# Patient Record
Sex: MALE
[De-identification: names, ages, dates, MRNs, and addresses within clinical notes are randomized per-mention and may not be internally consistent; named-entity substitution may affect disease eponyms.]

---

## 2021-05-10 ENCOUNTER — APPOINTMENT (OUTPATIENT)
Dept: DISASTER EMERGENCY | Facility: OTHER | Age: 41
End: 2021-05-10
Payer: COMMERCIAL

## 2021-05-10 PROCEDURE — 0012A: CPT

## 2021-12-22 ENCOUNTER — TRANSCRIPTION ENCOUNTER (OUTPATIENT)
Age: 41
End: 2021-12-22

## 2025-06-16 ENCOUNTER — INPATIENT (INPATIENT)
Facility: HOSPITAL | Age: 45
LOS: 1 days | Discharge: HOME | End: 2025-06-18
Attending: STUDENT IN AN ORGANIZED HEALTH CARE EDUCATION/TRAINING PROGRAM | Admitting: STUDENT IN AN ORGANIZED HEALTH CARE EDUCATION/TRAINING PROGRAM
Payer: COMMERCIAL

## 2025-06-16 VITALS
HEART RATE: 101 BPM | OXYGEN SATURATION: 98 % | DIASTOLIC BLOOD PRESSURE: 119 MMHG | SYSTOLIC BLOOD PRESSURE: 148 MMHG | WEIGHT: 212.08 LBS | RESPIRATION RATE: 20 BRPM | HEIGHT: 70 IN | TEMPERATURE: 98 F

## 2025-06-16 DIAGNOSIS — I10 ESSENTIAL (PRIMARY) HYPERTENSION: ICD-10-CM

## 2025-06-16 DIAGNOSIS — E03.9 HYPOTHYROIDISM, UNSPECIFIED: ICD-10-CM

## 2025-06-16 DIAGNOSIS — E78.5 HYPERLIPIDEMIA, UNSPECIFIED: ICD-10-CM

## 2025-06-16 DIAGNOSIS — I48.91 UNSPECIFIED ATRIAL FIBRILLATION: ICD-10-CM

## 2025-06-16 LAB
ALBUMIN SERPL ELPH-MCNC: 3.9 G/DL — SIGNIFICANT CHANGE UP (ref 3.3–5)
ALP SERPL-CCNC: 59 U/L — SIGNIFICANT CHANGE UP (ref 40–120)
ALT FLD-CCNC: 33 U/L — SIGNIFICANT CHANGE UP (ref 12–78)
ANION GAP SERPL CALC-SCNC: 5 MMOL/L — SIGNIFICANT CHANGE UP (ref 5–17)
AST SERPL-CCNC: 21 U/L — SIGNIFICANT CHANGE UP (ref 15–37)
BASOPHILS # BLD AUTO: 0.04 K/UL — SIGNIFICANT CHANGE UP (ref 0–0.2)
BASOPHILS NFR BLD AUTO: 0.5 % — SIGNIFICANT CHANGE UP (ref 0–2)
BILIRUB SERPL-MCNC: 0.4 MG/DL — SIGNIFICANT CHANGE UP (ref 0.2–1.2)
BUN SERPL-MCNC: 16 MG/DL — SIGNIFICANT CHANGE UP (ref 7–23)
CALCIUM SERPL-MCNC: 8.6 MG/DL — SIGNIFICANT CHANGE UP (ref 8.5–10.1)
CHLORIDE SERPL-SCNC: 111 MMOL/L — HIGH (ref 96–108)
CO2 SERPL-SCNC: 23 MMOL/L — SIGNIFICANT CHANGE UP (ref 22–31)
CREAT SERPL-MCNC: 0.96 MG/DL — SIGNIFICANT CHANGE UP (ref 0.5–1.3)
EGFR: 99 ML/MIN/1.73M2 — SIGNIFICANT CHANGE UP
EGFR: 99 ML/MIN/1.73M2 — SIGNIFICANT CHANGE UP
EOSINOPHIL # BLD AUTO: 0.11 K/UL — SIGNIFICANT CHANGE UP (ref 0–0.5)
EOSINOPHIL NFR BLD AUTO: 1.4 % — SIGNIFICANT CHANGE UP (ref 0–6)
GLUCOSE SERPL-MCNC: 117 MG/DL — HIGH (ref 70–99)
HCT VFR BLD CALC: 40.1 % — SIGNIFICANT CHANGE UP (ref 39–50)
HGB BLD-MCNC: 13.4 G/DL — SIGNIFICANT CHANGE UP (ref 13–17)
IMM GRANULOCYTES NFR BLD AUTO: 0.3 % — SIGNIFICANT CHANGE UP (ref 0–0.9)
LYMPHOCYTES # BLD AUTO: 2.67 K/UL — SIGNIFICANT CHANGE UP (ref 1–3.3)
LYMPHOCYTES # BLD AUTO: 34 % — SIGNIFICANT CHANGE UP (ref 13–44)
MAGNESIUM SERPL-MCNC: 2 MG/DL — SIGNIFICANT CHANGE UP (ref 1.6–2.6)
MCHC RBC-ENTMCNC: 29.2 PG — SIGNIFICANT CHANGE UP (ref 27–34)
MCHC RBC-ENTMCNC: 33.4 G/DL — SIGNIFICANT CHANGE UP (ref 32–36)
MCV RBC AUTO: 87.4 FL — SIGNIFICANT CHANGE UP (ref 80–100)
MONOCYTES # BLD AUTO: 0.59 K/UL — SIGNIFICANT CHANGE UP (ref 0–0.9)
MONOCYTES NFR BLD AUTO: 7.5 % — SIGNIFICANT CHANGE UP (ref 2–14)
NEUTROPHILS # BLD AUTO: 4.42 K/UL — SIGNIFICANT CHANGE UP (ref 1.8–7.4)
NEUTROPHILS NFR BLD AUTO: 56.3 % — SIGNIFICANT CHANGE UP (ref 43–77)
NRBC BLD AUTO-RTO: 0 /100 WBCS — SIGNIFICANT CHANGE UP (ref 0–0)
NT-PROBNP SERPL-SCNC: 69 PG/ML — SIGNIFICANT CHANGE UP (ref 0–125)
PLATELET # BLD AUTO: 240 K/UL — SIGNIFICANT CHANGE UP (ref 150–400)
POTASSIUM SERPL-MCNC: 3.7 MMOL/L — SIGNIFICANT CHANGE UP (ref 3.5–5.3)
POTASSIUM SERPL-SCNC: 3.7 MMOL/L — SIGNIFICANT CHANGE UP (ref 3.5–5.3)
PROT SERPL-MCNC: 7.3 GM/DL — SIGNIFICANT CHANGE UP (ref 6–8.3)
RBC # BLD: 4.59 M/UL — SIGNIFICANT CHANGE UP (ref 4.2–5.8)
RBC # FLD: 14 % — SIGNIFICANT CHANGE UP (ref 10.3–14.5)
SODIUM SERPL-SCNC: 139 MMOL/L — SIGNIFICANT CHANGE UP (ref 135–145)
TROPONIN I, HIGH SENSITIVITY RESULT: 15 NG/L — SIGNIFICANT CHANGE UP
WBC # BLD: 7.85 K/UL — SIGNIFICANT CHANGE UP (ref 3.8–10.5)
WBC # FLD AUTO: 7.85 K/UL — SIGNIFICANT CHANGE UP (ref 3.8–10.5)

## 2025-06-16 PROCEDURE — 71045 X-RAY EXAM CHEST 1 VIEW: CPT | Mod: 26

## 2025-06-16 PROCEDURE — 99222 1ST HOSP IP/OBS MODERATE 55: CPT

## 2025-06-16 PROCEDURE — 93010 ELECTROCARDIOGRAM REPORT: CPT

## 2025-06-16 PROCEDURE — 99285 EMERGENCY DEPT VISIT HI MDM: CPT

## 2025-06-16 RX ORDER — LOSARTAN POTASSIUM 100 MG/1
50 TABLET, FILM COATED ORAL DAILY
Refills: 0 | Status: DISCONTINUED | OUTPATIENT
Start: 2025-06-16 | End: 2025-06-18

## 2025-06-16 RX ORDER — ASPIRIN 325 MG
81 TABLET ORAL DAILY
Refills: 0 | Status: DISCONTINUED | OUTPATIENT
Start: 2025-06-17 | End: 2025-06-18

## 2025-06-16 RX ORDER — ACETAMINOPHEN 500 MG/5ML
650 LIQUID (ML) ORAL EVERY 6 HOURS
Refills: 0 | Status: DISCONTINUED | OUTPATIENT
Start: 2025-06-16 | End: 2025-06-18

## 2025-06-16 RX ORDER — ROSUVASTATIN CALCIUM 20 MG/1
20 TABLET, FILM COATED ORAL AT BEDTIME
Refills: 0 | Status: DISCONTINUED | OUTPATIENT
Start: 2025-06-16 | End: 2025-06-18

## 2025-06-16 RX ORDER — ROSUVASTATIN CALCIUM 20 MG/1
1 TABLET, FILM COATED ORAL
Refills: 0 | DISCHARGE

## 2025-06-16 RX ORDER — METOPROLOL SUCCINATE 50 MG/1
25 TABLET, EXTENDED RELEASE ORAL
Refills: 0 | Status: DISCONTINUED | OUTPATIENT
Start: 2025-06-16 | End: 2025-06-17

## 2025-06-16 RX ORDER — ASPIRIN 325 MG
325 TABLET ORAL ONCE
Refills: 0 | Status: COMPLETED | OUTPATIENT
Start: 2025-06-16 | End: 2025-06-16

## 2025-06-16 RX ORDER — LEVOTHYROXINE SODIUM 300 MCG
175 TABLET ORAL DAILY
Refills: 0 | Status: DISCONTINUED | OUTPATIENT
Start: 2025-06-16 | End: 2025-06-18

## 2025-06-16 RX ORDER — LEVOTHYROXINE SODIUM 300 MCG
1 TABLET ORAL
Refills: 0 | DISCHARGE

## 2025-06-16 RX ORDER — MELATONIN 5 MG
3 TABLET ORAL AT BEDTIME
Refills: 0 | Status: DISCONTINUED | OUTPATIENT
Start: 2025-06-16 | End: 2025-06-18

## 2025-06-16 RX ORDER — AMLODIPINE BESYLATE 10 MG/1
1 TABLET ORAL
Refills: 0 | DISCHARGE

## 2025-06-16 RX ADMIN — METOPROLOL SUCCINATE 25 MILLIGRAM(S): 50 TABLET, EXTENDED RELEASE ORAL at 17:43

## 2025-06-16 RX ADMIN — ROSUVASTATIN CALCIUM 20 MILLIGRAM(S): 20 TABLET, FILM COATED ORAL at 22:33

## 2025-06-16 RX ADMIN — Medication 325 MILLIGRAM(S): at 10:37

## 2025-06-16 RX ADMIN — METOPROLOL SUCCINATE 25 MILLIGRAM(S): 50 TABLET, EXTENDED RELEASE ORAL at 12:00

## 2025-06-16 NOTE — ED PROVIDER NOTE - PROGRESS NOTE DETAILS
pt signed out to me from Dr warner, pt presented with palpitations, found to be in a fib, has h/o flutter in the past, pending labs and admission

## 2025-06-16 NOTE — ED PROVIDER NOTE - CLINICAL SUMMARY MEDICAL DECISION MAKING FREE TEXT BOX
44 M pmh HTN, HLD, hypothyroid presenting to the ED for palpitations    EKG revealing Afib  will hold heparin (low risk for CVA)  labs  CXR  tele admit

## 2025-06-16 NOTE — ED ADULT NURSE NOTE - CHIEF COMPLAINT QUOTE
Pt c/o palpitations for a month, chest tightness. As per pt his I watch says "afib" Pmhx HTN, HLD, Thyroid disease. NKDA

## 2025-06-16 NOTE — H&P ADULT - PROBLEM SELECTOR PLAN 4
h/p hyperthyroidism s/p radioactive iodine treatment, now hypothyroidism on synthroid 175mcg daily  TSH normal

## 2025-06-16 NOTE — H&P ADULT - HISTORY OF PRESENT ILLNESS
44 years old male with h/o HTN, HLD, hypothyroidism, h/o atrial flutter present to ED with complain of tightness of chest pain palpitation. Patient reported intermitted palpitation for a months and recently worsened. Today AM, patient woke up with episode of palpitation associated with tightness of chest. No dizziness or diaphoresis.  Hemodynamically stable, afebrile, sat well at RA. EKG noted Afib with RVR. No leukocytosis, ptl 240, TSH normal, hsTnT negative. CXR with no focal consolidation

## 2025-06-16 NOTE — ED PROVIDER NOTE - OBJECTIVE STATEMENT
44 M pmh HTNM, hypothyroid, HLD presenting to the ED for palpitations. no current chest pain. patient states that he has chest pain yesterday and today he was awoken when his watch told him about an abnormal rhythm.

## 2025-06-16 NOTE — ED PROVIDER NOTE - CONSIDERATION OF ADMISSION OBSERVATION
will admit for new-onset Afib, will require cards evaluation and work-up. CHADSVASC score 1, will hold anticoagulation Consideration of Admission/Observation

## 2025-06-16 NOTE — ED PROVIDER NOTE - WR ORDER ID 1
Pharmacist Admission Medication History    Admission medication history is complete. The information provided in this note is only as accurate as the sources available at the time of the update.    Information Source(s): Patient and CareEverywhere/SureScripts via in-person    Pertinent Information: none    Changes made to PTA medication list:  Added: None  Deleted: celebrex, docusate, gabapentin, Norco, Prilosec, Zofran  Changed: None    Medication Affordability:  Not including over the counter (OTC) medications, was there a time in the past 3 months when you did not take your medications as prescribed because of cost?: No    Allergies reviewed with patient and updates made in EHR: yes    Medication History Completed By: Sunita Becerra RPH 11/16/2023 2:30 PM    PTA Med List   Medication Sig Last Dose    diltiazem ER (DILT-XR) 180 MG 24 hr capsule Take 1 capsule (180 mg) by mouth daily 11/16/2023 at AM    levothyroxine (SYNTHROID/LEVOTHROID) 150 MCG tablet Take 1 tablet (150 mcg) by mouth daily With 12.5mg tab 11/16/2023 at AM    levothyroxine (SYNTHROID/LEVOTHROID) 25 MCG tablet TAKE 1/2 TABLET BY MOUTH ONCE DAILY WITH 150MCG TABLET 11/16/2023 at AM    multivitamin w/minerals (THERA-VIT-M) tablet Take 1 tablet by mouth daily 11/16/2023 at AM    pravastatin (PRAVACHOL) 40 MG tablet TAKE 1 TABLET BY MOUTH ONCE DAILY (Patient taking differently: Take 40 mg by mouth every morning) 11/16/2023 at AM        [id number]

## 2025-06-16 NOTE — H&P ADULT - PROBLEM SELECTOR PLAN 1
EKG  ( I personally review and interpreted the images) Afib with RVR  hsTnT negative, BNP normal, TSH normal  h/o aflutter in 2015, following with cardiology. Outpatient record reviewed from patient's phone, Negative exercise stress test  and normal ECHO at [facility] on 11/2022  CHADVASC 1  aspirin 81mg  Start metoprolol 25mg bid, monitor HR and titrate BB.  Telemetry monitoring. If HR is controlled, outpatient follow up with his cardiologist

## 2025-06-16 NOTE — ED ADULT NURSE REASSESSMENT NOTE - NS ED NURSE REASSESS COMMENT FT1
"Pharmacy calls to request updated prescription for blood glucose monitoring (NO BRAND SPECIFIED) meter device kit. They are requesting that the phrase \"as directed\" be removed from the prescription as Medicare does not cover prescriptions using that verbiage.  " pt updated on care, given breakfast tray at this time.

## 2025-06-16 NOTE — H&P ADULT - NSHPPHYSICALEXAM_GEN_ALL_CORE
CONSTITUTIONAL: alert and cooperative, no acute distress  EYES: PERRL, no scleral icterus  ENT: Mucosa moist, tongue normal  NECK: Neck supple, trachea midline, non-tender  CARDIAC: Afib. No Pedal edema  LUNGS: Equal air entry both lungs. No rales, rhonchi, wheezing. Normal respiratory effort.   ABDOMEN: Soft, nondistended, nontender. No guarding or rebound tenderness. No hepatomegaly or splenomegaly. Bowel sound normal.  MUSCULOSKELETAL: Normocephalic, atraumatic. No significant deformity or joint abnormality  NEUROLOGICAL: No gross motor or sensory deficits  SKIN: no lesions or eruptions. Normal turgor  PSYCHIATRIC: A&O x 3, appropriate mood and affect

## 2025-06-16 NOTE — ED ADULT TRIAGE NOTE - CHIEF COMPLAINT QUOTE
Pt c/o palpitations for a month, chest tightness. Pmhx HTN, HLD, Thyroid disease. NKDA Pt c/o palpitations for a month, chest tightness. As per pt his I watch says "afib" Pmhx HTN, HLD, Thyroid disease. NKDA

## 2025-06-16 NOTE — ED ADULT NURSE REASSESSMENT NOTE - NS ED NURSE REASSESS COMMENT FT1
received pt from previous RN, pt AAOx4 breathing w/ ease on RA in no acute distress. On CCM, awaiting further orders, nursing care continues.

## 2025-06-16 NOTE — ED PROVIDER NOTE - NS ED ROS FT
General: Denies fever, chills  HEENT: Denies sensory changes, sore throat  Neck: Denies neck pain, neck stiffness  Resp: Denies coughing, SOB  Cardiovascular: + palpitations  GI: Denies nausea, vomiting, abdominal pain, diarrhea, constipation, blood in stool  : Denies dysuria, hematuria, frequency, incontinence  MSK: Denies back pain  Neuro: Denies HA, dizziness, numbness, weakness  Skin: Denies rashes.

## 2025-06-16 NOTE — ED ADULT NURSE NOTE - ED STAT RN HANDOFF DETAILS
report given to Damian RN, pt A/Ox4, pt in bed, even and unlabored respirations noted, pt on continuous cardiac monitor, pt a-fib on monitor, no signs or symptoms of acute distress noted

## 2025-06-16 NOTE — H&P ADULT - PROBLEM SELECTOR PLAN 2
continue losartan 50mg daily  Recently started on amlodipine 2.5mg. Now with Afib with RVR. Hold amlodipine and start metoprolol 25mg bid  Monitor BP and titrate BP med

## 2025-06-16 NOTE — ED PROVIDER NOTE - PHYSICAL EXAMINATION
General: Well appearing male in no acute distress  HEENT: Normocephalic, atraumatic. Moist mucous membranes. Oropharynx clear. No lymphadenopathy.  Eyes: No scleral icterus. EOMI. DANII.  Neck:. Soft and supple. Full ROM without pain. No midline tenderness  Cardiac: irregular rate & rhythm. No murmurs, rubs, gallops. Peripheral pulses 2+ and symmetric. No LE edema.  Resp: Lungs CTAB. Speaking in full sentences. No wheezes, rales or rhonchi.  Abd: Soft, non-tender, non-distended. No guarding or rebound. No scars, masses, or lesions.  Back: Spine midline and non-tender. No CVA tenderness.    Skin: No rashes, abrasions, or lacerations.  Neuro: AO x 3. Moves all extremities symmetrically. Motor strength and sensation grossly intact. ambulatory w/ steady gait

## 2025-06-17 ENCOUNTER — RESULT REVIEW (OUTPATIENT)
Age: 45
End: 2025-06-17

## 2025-06-17 LAB
A1C WITH ESTIMATED AVERAGE GLUCOSE RESULT: 5.6 % — SIGNIFICANT CHANGE UP (ref 4–5.6)
ALBUMIN SERPL ELPH-MCNC: 3.9 G/DL — SIGNIFICANT CHANGE UP (ref 3.3–5)
ALP SERPL-CCNC: 66 U/L — SIGNIFICANT CHANGE UP (ref 40–120)
ALT FLD-CCNC: 33 U/L — SIGNIFICANT CHANGE UP (ref 12–78)
ANION GAP SERPL CALC-SCNC: 8 MMOL/L — SIGNIFICANT CHANGE UP (ref 5–17)
AST SERPL-CCNC: 18 U/L — SIGNIFICANT CHANGE UP (ref 15–37)
BILIRUB SERPL-MCNC: 0.6 MG/DL — SIGNIFICANT CHANGE UP (ref 0.2–1.2)
BUN SERPL-MCNC: 16 MG/DL — SIGNIFICANT CHANGE UP (ref 7–23)
CALCIUM SERPL-MCNC: 9 MG/DL — SIGNIFICANT CHANGE UP (ref 8.5–10.1)
CHLORIDE SERPL-SCNC: 106 MMOL/L — SIGNIFICANT CHANGE UP (ref 96–108)
CHOLEST SERPL-MCNC: 108 MG/DL — SIGNIFICANT CHANGE UP
CO2 SERPL-SCNC: 23 MMOL/L — SIGNIFICANT CHANGE UP (ref 22–31)
CREAT SERPL-MCNC: 1.12 MG/DL — SIGNIFICANT CHANGE UP (ref 0.5–1.3)
EGFR: 82 ML/MIN/1.73M2 — SIGNIFICANT CHANGE UP
EGFR: 82 ML/MIN/1.73M2 — SIGNIFICANT CHANGE UP
ESTIMATED AVERAGE GLUCOSE: 114 MG/DL — SIGNIFICANT CHANGE UP (ref 68–114)
GLUCOSE SERPL-MCNC: 96 MG/DL — SIGNIFICANT CHANGE UP (ref 70–99)
HCT VFR BLD CALC: 39.9 % — SIGNIFICANT CHANGE UP (ref 39–50)
HDLC SERPL-MCNC: 28 MG/DL — LOW
HGB BLD-MCNC: 13.1 G/DL — SIGNIFICANT CHANGE UP (ref 13–17)
LDLC SERPL-MCNC: 53 MG/DL — SIGNIFICANT CHANGE UP
LIPID PNL WITH DIRECT LDL SERPL: 53 MG/DL — SIGNIFICANT CHANGE UP
MAGNESIUM SERPL-MCNC: 2.1 MG/DL — SIGNIFICANT CHANGE UP (ref 1.6–2.6)
MCHC RBC-ENTMCNC: 28.6 PG — SIGNIFICANT CHANGE UP (ref 27–34)
MCHC RBC-ENTMCNC: 32.8 G/DL — SIGNIFICANT CHANGE UP (ref 32–36)
MCV RBC AUTO: 87.1 FL — SIGNIFICANT CHANGE UP (ref 80–100)
NONHDLC SERPL-MCNC: 80 MG/DL — SIGNIFICANT CHANGE UP
NRBC BLD AUTO-RTO: 0 /100 WBCS — SIGNIFICANT CHANGE UP (ref 0–0)
PHOSPHATE SERPL-MCNC: 3.5 MG/DL — SIGNIFICANT CHANGE UP (ref 2.5–4.5)
PLATELET # BLD AUTO: 244 K/UL — SIGNIFICANT CHANGE UP (ref 150–400)
POTASSIUM SERPL-MCNC: 3.8 MMOL/L — SIGNIFICANT CHANGE UP (ref 3.5–5.3)
POTASSIUM SERPL-SCNC: 3.8 MMOL/L — SIGNIFICANT CHANGE UP (ref 3.5–5.3)
PROT SERPL-MCNC: 7.3 GM/DL — SIGNIFICANT CHANGE UP (ref 6–8.3)
RBC # BLD: 4.58 M/UL — SIGNIFICANT CHANGE UP (ref 4.2–5.8)
RBC # FLD: 13.9 % — SIGNIFICANT CHANGE UP (ref 10.3–14.5)
SODIUM SERPL-SCNC: 137 MMOL/L — SIGNIFICANT CHANGE UP (ref 135–145)
TRIGL SERPL-MCNC: 155 MG/DL — HIGH
WBC # BLD: 7.41 K/UL — SIGNIFICANT CHANGE UP (ref 3.8–10.5)
WBC # FLD AUTO: 7.41 K/UL — SIGNIFICANT CHANGE UP (ref 3.8–10.5)

## 2025-06-17 PROCEDURE — 99255 IP/OBS CONSLTJ NEW/EST HI 80: CPT

## 2025-06-17 PROCEDURE — 99232 SBSQ HOSP IP/OBS MODERATE 35: CPT

## 2025-06-17 PROCEDURE — 93306 TTE W/DOPPLER COMPLETE: CPT | Mod: 26

## 2025-06-17 PROCEDURE — 93010 ELECTROCARDIOGRAM REPORT: CPT

## 2025-06-17 RX ORDER — METOPROLOL SUCCINATE 50 MG/1
50 TABLET, EXTENDED RELEASE ORAL
Refills: 0 | Status: DISCONTINUED | OUTPATIENT
Start: 2025-06-17 | End: 2025-06-18

## 2025-06-17 RX ORDER — AMLODIPINE BESYLATE 10 MG/1
2.5 TABLET ORAL DAILY
Refills: 0 | Status: DISCONTINUED | OUTPATIENT
Start: 2025-06-17 | End: 2025-06-18

## 2025-06-17 RX ADMIN — METOPROLOL SUCCINATE 25 MILLIGRAM(S): 50 TABLET, EXTENDED RELEASE ORAL at 05:56

## 2025-06-17 RX ADMIN — ROSUVASTATIN CALCIUM 20 MILLIGRAM(S): 20 TABLET, FILM COATED ORAL at 21:01

## 2025-06-17 RX ADMIN — Medication 175 MICROGRAM(S): at 05:55

## 2025-06-17 RX ADMIN — Medication 81 MILLIGRAM(S): at 11:13

## 2025-06-17 NOTE — PROGRESS NOTE ADULT - SUBJECTIVE AND OBJECTIVE BOX
Patient is a 44y old  Male who presents with a chief complaint of Afib with RVR (16 Jun 2025 14:47)      INTERVAL HPI/OVERNIGHT EVENTS:  Patient seen and examined at bedside this am. He reports feeling well. No new complaints.     MEDICATIONS  (STANDING):  aspirin enteric coated 81 milliGRAM(s) Oral daily  levothyroxine 175 MICROGram(s) Oral daily  losartan 50 milliGRAM(s) Oral daily  rosuvastatin 20 milliGRAM(s) Oral at bedtime    MEDICATIONS  (PRN):  acetaminophen     Tablet .. 650 milliGRAM(s) Oral every 6 hours PRN Mild Pain (1 - 3), Moderate Pain (4 - 6)  melatonin 3 milliGRAM(s) Oral at bedtime PRN Insomnia      Allergies    No Known Allergies    Intolerances        REVIEW OF SYSTEMS:  CONSTITUTIONAL: No fever, weight loss  RESPIRATORY: No cough, wheezing, chills or hemoptysis; No shortness of breath  CARDIOVASCULAR: No chest pain, palpitations, lightheadedness, or leg swelling  GASTROINTESTINAL: No abdominal or epigastric pain. No nausea, vomiting, or hematemesis; No diarrhea or constipation. No melena or hematochezia.  MUSCULOSKELETAL: No joint pain or swelling; No muscle, back, or extremity pain  PSYCHIATRIC: No depression, anxiety, or mood swings      Vital Signs Last 24 Hrs  T(C): 36.9 (17 Jun 2025 16:01), Max: 36.9 (17 Jun 2025 16:01)  T(F): 98.5 (17 Jun 2025 16:01), Max: 98.5 (17 Jun 2025 16:01)  HR: 72 (17 Jun 2025 16:01) (58 - 74)  BP: 142/92 (17 Jun 2025 16:01) (116/77 - 142/92)  BP(mean): --  RR: 15 (17 Jun 2025 16:01) (15 - 19)  SpO2: 99% (17 Jun 2025 16:01) (96% - 100%)    Parameters below as of 17 Jun 2025 16:01  Patient On (Oxygen Delivery Method): room air        PHYSICAL EXAM:  GENERAL: NAD, well-groomed, well-developed  HEAD:  Atraumatic, Normocephalic  EYES: EOMI, PERRLA, conjunctiva and sclera clear  ENMT: Moist mucous membranes, Good dentition, No lesions; No tonsillar erythema, exudates, or enlargement  NECK: Supple, No JVD, Normal thyroid  NERVOUS SYSTEM:  Alert & Oriented X3, Good concentration; All 4 extremities mobile, no gross sensory deficits.   CHEST/LUNG: Clear to auscultation bilaterally; No rales, rhonchi, wheezing, or rubs  HEART: Regular rate and rhythm; No murmurs, rubs, or gallops  ABDOMEN: Soft, Nontender, Nondistended; Bowel sounds present  EXTREMITIES:  2+ Peripheral Pulses, No clubbing, cyanosis, or edema  SKIN: No rashes or lesions    LABS:                        13.1   7.41  )-----------( 244      ( 17 Jun 2025 06:05 )             39.9     17 Jun 2025 06:05    137    |  106    |  16     ----------------------------<  96     3.8     |  23     |  1.12     Ca    9.0        17 Jun 2025 06:05  Phos  3.5       17 Jun 2025 06:05  Mg     2.1       17 Jun 2025 06:05    TPro  7.3    /  Alb  3.9    /  TBili  0.6    /  DBili  x      /  AST  18     /  ALT  33     /  AlkPhos  66     17 Jun 2025 06:05      Urinalysis Basic - ( 17 Jun 2025 06:05 )    Color: x / Appearance: x / SG: x / pH: x  Gluc: 96 mg/dL / Ketone: x  / Bili: x / Urobili: x   Blood: x / Protein: x / Nitrite: x   Leuk Esterase: x / RBC: x / WBC x   Sq Epi: x / Non Sq Epi: x / Bacteria: x

## 2025-06-17 NOTE — PROGRESS NOTE ADULT - ASSESSMENT
44 years old male with h/o HTN, HLD, hypothyroidism, h/o atrial flutter present to ED with complain of tightness of chest pain palpitation. Patient reported intermitted palpitation for a months and recently worsened. Today AM, patient woke up with episode of palpitation associated with tightness of chest. No dizziness or diaphoresis.  Hemodynamically stable, afebrile, sat well at RA. EKG noted Afib with RVR. No leukocytosis, ptl 240, TSH normal, hsTnT negative. CXR with no focal consolidation      1.  Atrial fibrillation with rapid ventricular response.    EKG on admission Afib with RVR  hsTnT negative, BNP normal, TSH normal  h/o aflutter in 2015, following with cardiology. Outpatient record reviewed from patient's phone, Negative exercise stress test  and normal ECHO at [facility] on 11/2022  CHADVASC 1  aspirin 81mg  Started metoprolol 25mg bid, monitor HR and titrate BB. On hold currently as HR 60s. Patient initially converted and remained in sinus rhythm however on telemonitor is showing paroxysmal rate controlled Afib. Repeat EKG with sinus, cardiology consulted.   Telemetry monitoring. If HR is controlled and regular, outpatient follow up with his cardiologist.    2·  Benign essential HTN.   continue losartan 50mg daily  Recently started on amlodipine 2.5mg. c/w amlodipine and holding metoprolol 25mg bid due to heart rate  Monitor BP and titrate BP med.    3.  Hyperlipidemia, unspecified.    rosuvastatin 20mg hs.     4.Hypothyroidism.    h/p hyperthyroidism s/p radioactive iodine treatment, now hypothyroidism on synthroid 175mcg daily  TSH normal.    DVT: patient frequently ambulating and low risk for DVT

## 2025-06-17 NOTE — CONSULT NOTE ADULT - ASSESSMENT
44b M MOB HTN HLD AF RVR TSH normal  TTE pending  no recent flu like illness no ALVARES, C/P   palpitations no syncope  Agree losartan 25/d, rosuvastatin 20/d, ASA 81/d suggest add metoprolol 50mg BID  does not meet criteria for OAC stroke prevention  Consider GLP1 for weight loss and underlying AF resolution  no evidence of ACS (Trop or EKG)

## 2025-06-18 ENCOUNTER — TRANSCRIPTION ENCOUNTER (OUTPATIENT)
Age: 45
End: 2025-06-18

## 2025-06-18 VITALS
DIASTOLIC BLOOD PRESSURE: 86 MMHG | OXYGEN SATURATION: 97 % | RESPIRATION RATE: 18 BRPM | SYSTOLIC BLOOD PRESSURE: 125 MMHG | HEART RATE: 66 BPM | TEMPERATURE: 98 F

## 2025-06-18 LAB
ANION GAP SERPL CALC-SCNC: 7 MMOL/L — SIGNIFICANT CHANGE UP (ref 5–17)
BUN SERPL-MCNC: 16 MG/DL — SIGNIFICANT CHANGE UP (ref 7–23)
CALCIUM SERPL-MCNC: 9.1 MG/DL — SIGNIFICANT CHANGE UP (ref 8.5–10.1)
CHLORIDE SERPL-SCNC: 107 MMOL/L — SIGNIFICANT CHANGE UP (ref 96–108)
CO2 SERPL-SCNC: 24 MMOL/L — SIGNIFICANT CHANGE UP (ref 22–31)
CREAT SERPL-MCNC: 1.06 MG/DL — SIGNIFICANT CHANGE UP (ref 0.5–1.3)
EGFR: 88 ML/MIN/1.73M2 — SIGNIFICANT CHANGE UP
EGFR: 88 ML/MIN/1.73M2 — SIGNIFICANT CHANGE UP
GLUCOSE SERPL-MCNC: 102 MG/DL — HIGH (ref 70–99)
MAGNESIUM SERPL-MCNC: 2.3 MG/DL — SIGNIFICANT CHANGE UP (ref 1.6–2.6)
PHOSPHATE SERPL-MCNC: 3.6 MG/DL — SIGNIFICANT CHANGE UP (ref 2.5–4.5)
POTASSIUM SERPL-MCNC: 3.8 MMOL/L — SIGNIFICANT CHANGE UP (ref 3.5–5.3)
POTASSIUM SERPL-SCNC: 3.8 MMOL/L — SIGNIFICANT CHANGE UP (ref 3.5–5.3)
SODIUM SERPL-SCNC: 138 MMOL/L — SIGNIFICANT CHANGE UP (ref 135–145)

## 2025-06-18 PROCEDURE — 99239 HOSP IP/OBS DSCHRG MGMT >30: CPT

## 2025-06-18 RX ORDER — METOPROLOL SUCCINATE 50 MG/1
50 TABLET, EXTENDED RELEASE ORAL DAILY
Refills: 0 | Status: DISCONTINUED | OUTPATIENT
Start: 2025-06-18 | End: 2025-06-18

## 2025-06-18 RX ORDER — LOSARTAN POTASSIUM 100 MG/1
25 TABLET, FILM COATED ORAL DAILY
Refills: 0 | Status: DISCONTINUED | OUTPATIENT
Start: 2025-06-19 | End: 2025-06-18

## 2025-06-18 RX ORDER — LOSARTAN POTASSIUM 100 MG/1
1 TABLET, FILM COATED ORAL
Refills: 0 | DISCHARGE

## 2025-06-18 RX ORDER — AMLODIPINE BESYLATE 10 MG/1
1 TABLET ORAL
Refills: 0 | DISCHARGE

## 2025-06-18 RX ORDER — LOSARTAN POTASSIUM 100 MG/1
1 TABLET, FILM COATED ORAL
Qty: 20 | Refills: 0
Start: 2025-06-18 | End: 2025-07-07

## 2025-06-18 RX ORDER — METOPROLOL SUCCINATE 50 MG/1
1 TABLET, EXTENDED RELEASE ORAL
Qty: 20 | Refills: 0
Start: 2025-06-18 | End: 2025-07-07

## 2025-06-18 RX ADMIN — METOPROLOL SUCCINATE 50 MILLIGRAM(S): 50 TABLET, EXTENDED RELEASE ORAL at 13:01

## 2025-06-18 RX ADMIN — METOPROLOL SUCCINATE 50 MILLIGRAM(S): 50 TABLET, EXTENDED RELEASE ORAL at 08:25

## 2025-06-18 RX ADMIN — Medication 81 MILLIGRAM(S): at 11:26

## 2025-06-18 RX ADMIN — Medication 175 MICROGRAM(S): at 05:11

## 2025-06-18 RX ADMIN — AMLODIPINE BESYLATE 2.5 MILLIGRAM(S): 10 TABLET ORAL at 05:11

## 2025-06-18 NOTE — DISCHARGE NOTE NURSING/CASE MANAGEMENT/SOCIAL WORK - PATIENT PORTAL LINK FT
You can access the FollowMyHealth Patient Portal offered by [facility] by registering at the following website: [email or web address]. By joining [facility]’s FollowMyHealth portal, you will also be able to view your health information using other applications (apps) compatible with our system.

## 2025-06-18 NOTE — PROGRESS NOTE ADULT - ASSESSMENT
44M HTN HLD hypothyroidism AF, p/w c/o RVR TSH normal  TTE pending  no recent flu like illness no ALVARES, C/P   palpitations no syncope  Agree losartan 25/d, rosuvastatin 20/d, ASA 81/d suggest add metoprolol 50mg BID  does not meet criteria for OAC stroke prevention  Consider GLP1 for weight loss and underlying AF resolution  no evidence of ACS (Trop or EKG) 44M HTN HLD hypothyroidism AF, p/w c/o palpitation and chest tightness   afib with RVR responded to bbl    currently in sinus  TSH wnl  TTE unremarkable    Add Toprol 50/d  cont losartan 25/d, rosuvastatin 20/d, ASA 81/d   will stop amlodipine   cont synthroid   does not meet criteria for OAC stroke prevention  no evidence of ACS (Trop or EKG)  outpatient cardiology follow up with his primary cardiologist post discharge in a week

## 2025-06-18 NOTE — PROGRESS NOTE ADULT - SUBJECTIVE AND OBJECTIVE BOX
Patient is a 44y old  Male who presents with a chief complaint of chest tightness and palpitation     PAST MEDICAL & SURGICAL HISTORY:  HTN    HLD    hypothyroidism    atrial flutter     INTERVAL HISTORY:  	  MEDICATIONS:  MEDICATIONS  (STANDING):  aspirin enteric coated 81 milliGRAM(s) Oral daily  levothyroxine 175 MICROGram(s) Oral daily  metoprolol tartrate 50 milliGRAM(s) Oral two times a day  rosuvastatin 20 milliGRAM(s) Oral at bedtime    MEDICATIONS  (PRN):  acetaminophen     Tablet .. 650 milliGRAM(s) Oral every 6 hours PRN Mild Pain (1 - 3), Moderate Pain (4 - 6)  melatonin 3 milliGRAM(s) Oral at bedtime PRN Insomnia    Vitals:  T(F): 98 (06-18-25 @ 04:56), Max: 98.5 (06-17-25 @ 16:01)  HR: 69 (06-18-25 @ 08:10) (59 - 86)  BP: 127/75 (06-18-25 @ 08:10) (112/77 - 142/92)  RR: 18 (06-18-25 @ 04:56) (15 - 18)  SpO2: 96% (06-18-25 @ 04:56) (96% - 100%)    06-17 @ 07:01  -  06-18 @ 07:00  --------------------------------------------------------  IN:    Oral Fluid: 240 mL  Total IN: 240 mL    OUT:  Total OUT: 0 mL    Total NET: 240 mL    Weight (kg): 93.8 (06-17 @ 17:27)  BMI (kg/m2): 31.4 (06-17 @ 17:27)    PHYSICAL EXAM:  Neuro: Awake, responsive  CV: S1 S2 RRR  Lungs: CTABL  GI: Soft, BS +, ND, NT  Extremities: No edema    TELEMETRY: sinus    RADIOLOGY: < from: Xray Chest 1 View- PORTABLE-Urgent (06.16.25 @ 06:16) >    Normal heart mediastinum.  no consolidation or effusion. No pneumothorax    IMPRESSION: Clear lungs    < end of copied text >    DIAGNOSTIC TESTING:    [x ] Echocardiogram: < from: TTE Echo Complete w/o Contrast w/ Doppler (06.17.25 @ 16:40) >   1. Left ventricular systolic function is normal with an ejection   fraction of 59 % by Smith's method of disks.   2. Left atrium is mildly dilated.   3. Mild pulmonic regurgitation.   4. No pericardial effusion seen.    < end of copied text >    LABS:	 	    CARDIAC MARKERS:  Troponin I, High Sensitivity Result: 15.0 ng/L (06-16 @ 06:45)    18 Jun 2025 06:15    138    |  107    |  16     ----------------------------<  102    3.8     |  24     |  1.06   17 Jun 2025 06:05    137    |  106    |  16     ----------------------------<  96     3.8     |  23     |  1.12   16 Jun 2025 06:45    139    |  111    |  16     ----------------------------<  117    3.7     |  23     |  0.96     Ca    9.1        18 Jun 2025 06:15  Phos  3.6       18 Jun 2025 06:15  Mg     2.3       18 Jun 2025 06:15    TPro  7.3    /  Alb  3.9    /  TBili  0.6    /  DBili  x      /  AST  18     /  ALT  33     /  AlkPhos  66     17 Jun 2025 06:05                        13.1   7.41  )-----------( 244      ( 17 Jun 2025 06:05 )             39.9 ,                       13.4   7.85  )-----------( 240      ( 16 Jun 2025 06:45 )             40.1   pro-BNP: Pro-Brain Natriuretic Peptide: 69 pg/mL (06.16.25 @ 06:45)    Lipid Profile: 06-17 @ 06:05  HDL Chol:              28 mg/dL  Serum Chol:            108 mg/dL  Triglycerides:         155 mg/dL  TSH: Thyroid Stimulating Hormone, Serum: 1.080 uU/mL (06-16 @ 06:45)                 Patient is a 44y old  Male who presents with a chief complaint of chest tightness and palpitation     PAST MEDICAL & SURGICAL HISTORY:  HTN    HLD    hypothyroidism    atrial flutter     INTERVAL HISTORY: in no distress, denies any chest pain or palpitation   	  MEDICATIONS:  MEDICATIONS  (STANDING):  aspirin enteric coated 81 milliGRAM(s) Oral daily  levothyroxine 175 MICROGram(s) Oral daily  metoprolol tartrate 50 milliGRAM(s) Oral two times a day  rosuvastatin 20 milliGRAM(s) Oral at bedtime    MEDICATIONS  (PRN):  acetaminophen     Tablet .. 650 milliGRAM(s) Oral every 6 hours PRN Mild Pain (1 - 3), Moderate Pain (4 - 6)  melatonin 3 milliGRAM(s) Oral at bedtime PRN Insomnia    Vitals:  T(F): 98 (06-18-25 @ 04:56), Max: 98.5 (06-17-25 @ 16:01)  HR: 69 (06-18-25 @ 08:10) (59 - 86)  BP: 127/75 (06-18-25 @ 08:10) (112/77 - 142/92)  RR: 18 (06-18-25 @ 04:56) (15 - 18)  SpO2: 96% (06-18-25 @ 04:56) (96% - 100%)    06-17 @ 07:01  -  06-18 @ 07:00  --------------------------------------------------------  IN:    Oral Fluid: 240 mL  Total IN: 240 mL    OUT:  Total OUT: 0 mL    Total NET: 240 mL    Weight (kg): 93.8 (06-17 @ 17:27)  BMI (kg/m2): 31.4 (06-17 @ 17:27)    PHYSICAL EXAM:  Neuro: Awake, responsive  CV: S1 S2 RRR  Lungs: CTABL  GI: Soft, BS +, ND, NT  Extremities: No edema    TELEMETRY: sinus    RADIOLOGY: < from: Xray Chest 1 View- PORTABLE-Urgent (06.16.25 @ 06:16) >    Normal heart mediastinum.  no consolidation or effusion. No pneumothorax    IMPRESSION: Clear lungs    < end of copied text >    DIAGNOSTIC TESTING:    [x ] Echocardiogram: < from: TTE Echo Complete w/o Contrast w/ Doppler (06.17.25 @ 16:40) >   1. Left ventricular systolic function is normal with an ejection   fraction of 59 % by Smith's method of disks.   2. Left atrium is mildly dilated.   3. Mild pulmonic regurgitation.   4. No pericardial effusion seen.    < end of copied text >    LABS:	 	    CARDIAC MARKERS:  Troponin I, High Sensitivity Result: 15.0 ng/L (06-16 @ 06:45)    18 Jun 2025 06:15    138    |  107    |  16     ----------------------------<  102    3.8     |  24     |  1.06   17 Jun 2025 06:05    137    |  106    |  16     ----------------------------<  96     3.8     |  23     |  1.12   16 Jun 2025 06:45    139    |  111    |  16     ----------------------------<  117    3.7     |  23     |  0.96     Ca    9.1        18 Jun 2025 06:15  Phos  3.6       18 Jun 2025 06:15  Mg     2.3       18 Jun 2025 06:15    TPro  7.3    /  Alb  3.9    /  TBili  0.6    /  DBili  x      /  AST  18     /  ALT  33     /  AlkPhos  66     17 Jun 2025 06:05                        13.1   7.41  )-----------( 244      ( 17 Jun 2025 06:05 )             39.9 ,                       13.4   7.85  )-----------( 240      ( 16 Jun 2025 06:45 )             40.1   pro-BNP: Pro-Brain Natriuretic Peptide: 69 pg/mL (06.16.25 @ 06:45)    Lipid Profile: 06-17 @ 06:05  HDL Chol:              28 mg/dL  Serum Chol:            108 mg/dL  Triglycerides:         155 mg/dL  TSH: Thyroid Stimulating Hormone, Serum: 1.080 uU/mL (06-16 @ 06:45)

## 2025-06-18 NOTE — DISCHARGE NOTE PROVIDER - NSDCMRMEDTOKEN_GEN_ALL_CORE_FT
amLODIPine 2.5 mg oral tablet: 1 tab(s) orally once a day  losartan 50 mg oral tablet: 1 tab(s) orally once a day  rosuvastatin 20 mg oral capsule: 1 cap(s) orally once a day (at bedtime)  Synthroid 175 mcg (0.175 mg) oral tablet: 1 tab(s) orally once a day

## 2025-06-18 NOTE — DISCHARGE NOTE NURSING/CASE MANAGEMENT/SOCIAL WORK - FINANCIAL ASSISTANCE
[facility] provides services at a reduced cost to those who are determined to be eligible through [facility]’s financial assistance program. Information regarding [facility]’s financial assistance program can be found by going to [email or web address] or by calling [phone].

## 2025-06-18 NOTE — DISCHARGE NOTE NURSING/CASE MANAGEMENT/SOCIAL WORK - NSDCPEFALRISK_GEN_ALL_CORE
For information on Fall & Injury Prevention, visit: [email or web address] OR  [email or web address] OR  https://www.cdc.gov/steadi/patient.html

## 2025-06-18 NOTE — DISCHARGE NOTE PROVIDER - HOSPITAL COURSE
HPI:  44 years old male with h/o HTN, HLD, hypothyroidism, h/o atrial flutter present to ED with complain of tightness of chest pain palpitation. Patient reported intermitted palpitation for a months and recently worsened. Today AM, patient woke up with episode of palpitation associated with tightness of chest. No dizziness or diaphoresis.  Hemodynamically stable, afebrile, sat well at RA. EKG noted Afib with RVR. No leukocytosis, ptl 240, TSH normal, hsTnT negative. CXR with no focal consolidation (16 Jun 2025 14:47)      ---  HOSPITAL COURSE: Patient admitted to medicine floor for management of Afib RVR.  44 years old male with h/o HTN, HLD, hypothyroidism, h/o atrial flutter present to ED with complain of tightness of chest pain palpitation. Patient reported intermitted palpitation for a months and recently worsened. Today AM, patient woke up with episode of palpitation associated with tightness of chest. No dizziness or diaphoresis.  Hemodynamically stable, afebrile, sat well at RA. EKG noted Afib with RVR. No leukocytosis, ptl 240, TSH normal, hsTnT negative. CXR with no focal consolidation      1.  Atrial fibrillation with rapid ventricular response.    EKG on admission Afib with RVR  hsTnT negative, BNP normal, TSH normal  h/o aflutter in 2015, following with cardiology. Outpatient record reviewed from patient's phone, Negative exercise stress test  and normal ECHO at [facility] on 11/2022  CHADVASC 1  aspirin 81mg  Started metoprolol 50mg bid, monitor HR and titrated BB. Patient initially converted and remained in sinus rhythm however on telemonitor is showing paroxysmal rate controlled Afib. Repeat EKG with sinus, cardiology consulted. Patient may c/w metoprolol 50mg daily and outpatient follow up with his cardiologist. patient isntructed not to take metoprolol if heart rate less than 60bpm. He continuously monitors heart on his watch device.    2·  Benign essential HTN.   continue losartan 50mg daily  Recently started on amlodipine 2.5mg. Hold amlodipine as BP WNL and now on metoprolol.      3.  Hyperlipidemia, unspecified.    rosuvastatin 20mg hs.     4.Hypothyroidism.    h/p hyperthyroidism s/p radioactive iodine treatment, now hypothyroidism on synthroid 175mcg daily  TSH normal  Pt seen and examined on day of discharge. Patient is medically optimized for discharge to home with close outpatient followup.    PHYSICAL EXAM ON DAY OF DISCHARGE:  GENERAL: NAD, well-groomed, well-developed  HEAD:  Atraumatic, Normocephalic  EYES: EOMI, PERRLA, conjunctiva and sclera clear  ENMT: Moist mucous membranes, Good dentition, No lesions; No tonsillar erythema, exudates, or enlargement  NECK: Supple, No JVD, Normal thyroid  NERVOUS SYSTEM:  Alert & Oriented X3, Good concentration; All 4 extremities mobile, no gross sensory deficits.   CHEST/LUNG: Clear to auscultation bilaterally; No rales, rhonchi, wheezing, or rubs  HEART: Regular rate and rhythm; No murmurs, rubs, or gallops  ABDOMEN: Soft, Nontender, Nondistended; Bowel sounds present  EXTREMITIES:  2+ Peripheral Pulses, No clubbing, cyanosis, or edema  SKIN: No rashes or lesions      ---  CONSULTANTS:     ---  TIME SPENT:  I, the attending physician, was physically present for the key portions of the evaluation and management (E/M) service provided. The total amount of time spent reviewing the hospital notes, laboratory values, imaging findings, assessing/counseling the patient, discussing with consultant physicians, social work, nursing staff was -- minutes    ---  Primary care provider was made aware of plan for discharge:      [  ] NO     [  ] YES

## 2025-06-18 NOTE — DISCHARGE NOTE PROVIDER - PROVIDER TOKENS
FREE:[LAST:[Bettye],FIRST:[Eliezer],PHONE:[(   )    -],FAX:[(   )    -],ADDRESS:[Cardiology],FOLLOWUP:[1-3 days]]

## 2025-06-18 NOTE — DISCHARGE NOTE PROVIDER - NSDCCPCAREPLAN_GEN_ALL_CORE_FT
PRINCIPAL DISCHARGE DIAGNOSIS  Diagnosis: New onset atrial fibrillation  Assessment and Plan of Treatment: You meghan to the hospital because you were having palpitations due to an abnormal heart rhythm and you were started on metoprolol succinate 50 daily which you will continue at home. Please hold amlodipine as your BP is within normal limits while on metoprolol and losartan in the hosital. If you note a heart rate of less than 60bpm on your watch please hold the metoprolol dose that day. Follow up with your cardiologist as outpatient.      SECONDARY DISCHARGE DIAGNOSES  Diagnosis: Benign essential HTN  Assessment and Plan of Treatment: Continue with losartan at home and monitor BP. Hold amlodipine.    Diagnosis: Hyperlipidemia, unspecified  Assessment and Plan of Treatment: continue on home statin    Diagnosis: Hypothyroidism  Assessment and Plan of Treatment: continue home levothyroxine

## 2025-06-26 DIAGNOSIS — E03.9 HYPOTHYROIDISM, UNSPECIFIED: ICD-10-CM

## 2025-06-26 DIAGNOSIS — Z79.890 HORMONE REPLACEMENT THERAPY: ICD-10-CM

## 2025-06-26 DIAGNOSIS — E78.5 HYPERLIPIDEMIA, UNSPECIFIED: ICD-10-CM

## 2025-06-26 DIAGNOSIS — I10 ESSENTIAL (PRIMARY) HYPERTENSION: ICD-10-CM

## 2025-06-26 DIAGNOSIS — I48.91 UNSPECIFIED ATRIAL FIBRILLATION: ICD-10-CM
